# Patient Record
Sex: MALE
[De-identification: names, ages, dates, MRNs, and addresses within clinical notes are randomized per-mention and may not be internally consistent; named-entity substitution may affect disease eponyms.]

---

## 2020-10-12 LAB
ANION GAP SERPL CALC-SCNC: 13.5 MMOL/L (ref 8–16)
BASOPHILS # BLD AUTO: 0.1 10*3/UL (ref 0–0.1)
BASOPHILS NFR BLD AUTO: 1.6 % (ref 0–1)
BUN SERPL-MCNC: 17 MG/DL (ref 7–26)
BUN/CREAT SERPL: 17 (ref 6–25)
CALCIUM SERPL-MCNC: 8.9 MG/DL (ref 8.4–10.2)
CHLORIDE SERPL-SCNC: 106 MMOL/L (ref 98–107)
CO2 SERPL-SCNC: 25 MMOL/L (ref 22–29)
DEPRECATED NEUTROPHILS # BLD AUTO: 1.9 10*3/UL (ref 2.1–6.9)
EGFRCR SERPLBLD CKD-EPI 2021: > 60 ML/MIN (ref 60–?)
EOSINOPHIL # BLD AUTO: 0.1 10*3/UL (ref 0–0.4)
EOSINOPHIL NFR BLD AUTO: 2.5 % (ref 0–6)
ERYTHROCYTE [DISTWIDTH] IN CORD BLOOD: 12.9 % (ref 11.7–14.4)
GLUCOSE SERPLBLD-MCNC: 93 MG/DL (ref 74–118)
HCT VFR BLD AUTO: 40.2 % (ref 38.2–49.6)
HGB BLD-MCNC: 13.1 G/DL (ref 14–18)
LYMPHOCYTES # BLD: 1.2 10*3/UL (ref 1–3.2)
LYMPHOCYTES NFR BLD AUTO: 32.3 % (ref 18–39.1)
MCH RBC QN AUTO: 31.8 PG (ref 28–32)
MCHC RBC AUTO-ENTMCNC: 32.6 G/DL (ref 31–35)
MCV RBC AUTO: 97.6 FL (ref 81–99)
MONOCYTES # BLD AUTO: 0.5 10*3/UL (ref 0.2–0.8)
MONOCYTES NFR BLD AUTO: 12.3 % (ref 4.4–11.3)
NEUTS SEG NFR BLD AUTO: 51 % (ref 38.7–80)
PLATELET # BLD AUTO: 216 X10E3/UL (ref 140–360)
POTASSIUM SERPL-SCNC: 4.5 MMOL/L (ref 3.5–5.1)
RBC # BLD AUTO: 4.12 X10E6/UL (ref 4.3–5.7)
SODIUM SERPL-SCNC: 140 MMOL/L (ref 136–145)

## 2020-10-12 NOTE — DIAGNOSTIC IMAGING REPORT
Chest, 2 views,  10/12/2020.   

 



History: Preop, Urologic procedure.



Comparison: None available.



Findings: The cardiomediastinal silhouette and pulmonary vasculature are within

normal limits. There is no focal consolidation or pleural effusion. Linear

opacities are present in the left lung base suggestive of scarring. There are

no acute osseous or soft tissue abnormalities. 



Impression: 

No acute cardiopulmonary abnormality.



Signed by: Randy Ahumada on 10/12/2020 2:51 PM

## 2020-10-15 ENCOUNTER — HOSPITAL ENCOUNTER (OUTPATIENT)
Dept: HOSPITAL 88 - OR | Age: 70
Discharge: HOME | End: 2020-10-15
Attending: UROLOGY
Payer: MEDICARE

## 2020-10-15 VITALS — SYSTOLIC BLOOD PRESSURE: 154 MMHG | DIASTOLIC BLOOD PRESSURE: 90 MMHG

## 2020-10-15 DIAGNOSIS — Z88.2: ICD-10-CM

## 2020-10-15 DIAGNOSIS — Z01.810: ICD-10-CM

## 2020-10-15 DIAGNOSIS — C61: ICD-10-CM

## 2020-10-15 DIAGNOSIS — Z11.59: ICD-10-CM

## 2020-10-15 DIAGNOSIS — N20.1: Primary | ICD-10-CM

## 2020-10-15 DIAGNOSIS — Z88.1: ICD-10-CM

## 2020-10-15 DIAGNOSIS — Z84.89: ICD-10-CM

## 2020-10-15 DIAGNOSIS — N39.3: ICD-10-CM

## 2020-10-15 DIAGNOSIS — Z01.818: ICD-10-CM

## 2020-10-15 DIAGNOSIS — Z01.812: ICD-10-CM

## 2020-10-15 PROCEDURE — 71046 X-RAY EXAM CHEST 2 VIEWS: CPT

## 2020-10-15 PROCEDURE — 85025 COMPLETE CBC W/AUTO DIFF WBC: CPT

## 2020-10-15 PROCEDURE — 88300 SURGICAL PATH GROSS: CPT

## 2020-10-15 PROCEDURE — 80048 BASIC METABOLIC PNL TOTAL CA: CPT

## 2020-10-15 PROCEDURE — 74420 UROGRAPHY RTRGR +-KUB: CPT

## 2020-10-15 PROCEDURE — 93005 ELECTROCARDIOGRAM TRACING: CPT

## 2020-10-15 PROCEDURE — 52352 CYSTOURETERO W/STONE REMOVE: CPT

## 2020-10-15 PROCEDURE — 52332 CYSTOSCOPY AND TREATMENT: CPT

## 2020-10-15 PROCEDURE — 36415 COLL VENOUS BLD VENIPUNCTURE: CPT

## 2020-10-16 NOTE — OPERATIVE REPORT
DATE OF PROCEDURE:  10/15/2020

 

SURGEON:  Ady Blakely MD

 

PREOPERATIVE DIAGNOSIS:  Bilateral ureteral stones.

 

POSTOPERATIVE DIAGNOSIS:  Left distal ureteral stone and a right distal ureteral scar.

 

OPERATIVE PROCEDURE:  

1. Cystoscopy.

2. Bilateral retrograde pyelogram.

3. Bilateral ureteroscopy.

4. Removal of left ureteral stones.

5. Placement of bilateral ureteral stents.

 

ANESTHESIA:  General anesthesia.

 

ESTIMATED BLOOD LOSS:  Minimal.

 

INDICATIONS:  Mr. Killian Lepe is a 70-year-old gentleman who previously undergone

radical prostatectomy for prostate cancer, presented recently with abdominal pain and

was found to have a 1 to 2 mm stone on the right side and a 3 to 4 mm stone on the left

side, both in the distal ureter.  He now presents for definitive surgical management of

both of these after failure of trial of passage. 

 

PROCEDURE IN DETAIL:  The patient was brought into the operating room and placed in

supine position.  After administration of general anesthesia, was placed in dorsal

lithotomy position and prepped and draped in the usual sterile fashion.

Cystourethroscopy was performed using 21-Kenyan cystoscope.  The anterior and posterior

urethra were noted to be normal.  The prostate was surgically absent.  The patient had a

good bladder neck reconstruction with presumed incontinence.  Bladder was entered

without difficulty.  Upon entrance into the bladder, the ureteral orifices were in

normal anatomical position and produced clear efflux.  The left and subsequently the

right ureters were cannulated with a 5-Kenyan open-ended catheter and retrograde

pyelogram was performed.  The left side revealed a filling defect suspicious for the

stone approximately 3 cm proximal to the ureterovesical junction.  The right side

revealed moderate narrowing and a questionable filling defect about 1 to 2 cm above the

ureterovesical anastomosis.  The left side was attached first.  A wire was placed up

into the left renal pelvis and the left ureteral orifice was dilated with a UroMax

balloon.  Rigid ureteroscopy was then performed.  The filling defect was found to be

calculi and this was grabbed with a basket and removed in its entirety.  It was

ultimately sent to Pathology for microscopic analysis.  Repeat ureteroscopy up to the

level of the ureteropelvic junction revealed no other calculi.  The ureteroscope was

then carefully removed and a 6-Kenyan 28 cm stent was placed such that one coil was in

the renal pelvis and the subsequent coil was in the bladder.  The string was allowed to

exit the urethral meatus.  The wire was then placed up the right ureteral orifice and up

into the right renal pelvis.  Again, the ureterovesical junction was dilated with a

UroMax balloon.  Rigid ureteroscopy was then performed on this side.  The distal ureter

was noted to be moderately strictured and a small perforation posteriorly was occurred

with passage of the scope.  The scope was then brought out and was allowed to pass

freely up to the region of the mid ureter.  No calculi were seen in this location.  The

scope was therefore removed carefully so as to perform no other trauma and a 6-Kenyan 28

cm stent was placed on this side as well.  Both strings were allowed to exit the

urethral meatus and both were tied together.  The bladder was drained in its entirety

and the cystoscope and the sheath were removed.  The patient was returned to the supine

position and anesthesia was reversed.  He was transferred to a bed and taken to the 

postanesthesia care unit in good condition.  Of note, the needle and instrument count

were correct at the conclusion of the case. 

 

 

 

 

______________________________

MD CAMACHO Hays/TRACEY

D:  10/15/2020 19:14:30

T:  10/16/2020 01:01:16

Job #:  [id number]